# Patient Record
Sex: MALE | Race: WHITE | Employment: UNEMPLOYED | URBAN - METROPOLITAN AREA
[De-identification: names, ages, dates, MRNs, and addresses within clinical notes are randomized per-mention and may not be internally consistent; named-entity substitution may affect disease eponyms.]

---

## 2021-10-29 ENCOUNTER — HOSPITAL ENCOUNTER (EMERGENCY)
Age: 61
End: 2021-10-30
Attending: EMERGENCY MEDICINE

## 2021-10-29 DIAGNOSIS — I46.9 CARDIAC ARREST (HCC): Primary | ICD-10-CM

## 2021-10-29 PROCEDURE — 96374 THER/PROPH/DIAG INJ IV PUSH: CPT

## 2021-10-29 PROCEDURE — 99284 EMERGENCY DEPT VISIT MOD MDM: CPT

## 2021-10-29 PROCEDURE — 6360000002 HC RX W HCPCS

## 2021-10-29 PROCEDURE — 6360000002 HC RX W HCPCS: Performed by: EMERGENCY MEDICINE

## 2021-10-29 RX ADMIN — EPINEPHRINE 1 MCG: 0.1 INJECTION, SOLUTION ENDOTRACHEAL; INTRACARDIAC; INTRAVENOUS at 23:52

## 2021-10-29 RX ADMIN — EPINEPHRINE 1 MG: 0.1 INJECTION, SOLUTION ENDOTRACHEAL; INTRACARDIAC; INTRAVENOUS at 23:47

## 2021-10-30 VITALS — TEMPERATURE: 99.9 F

## 2021-10-30 PROCEDURE — 92950 HEART/LUNG RESUSCITATION CPR: CPT

## 2021-10-30 RX ORDER — EPINEPHRINE 0.1 MG/ML
SYRINGE (ML) INJECTION DAILY PRN
Status: COMPLETED | OUTPATIENT
Start: 2021-10-29 | End: 2021-10-29
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
## 2021-10-30 NOTE — ED NOTES
Bed: 13  Expected date:   Expected time:   Means of arrival:   Comments:     Maria Luisa Ramos RN  10/29/21 5879

## 2021-10-30 NOTE — ED PROVIDER NOTES
h     [address] [facility]   Emergency Department  Faculty Attestation       I performed a history and physical examination of the patient and discussed management with the resident. I reviewed the residents note and agree with the documented findings including all diagnostic interpretations and plan of care. Any areas of disagreement are noted on the chart. I was personally present for the key portions of any procedures. I have documented in the chart those procedures where I was not present during the key portions. I have reviewed the emergency nurses triage note. I agree with the chief complaint, past medical history, past surgical history, allergies, medications, social and family history as documented unless otherwise noted below. Documentation of the HPI, Physical Exam and Medical Decision Making performed by ranjeetibromain is based on my personal performance of the HPI, PE and MDM. For Physician Assistant/ Nurse Practitioner cases/documentation I have personally evaluated this patient and have completed at least one if not all key elements of the E/M (history, physical exam, and MDM). Additional findings are as noted. Pertinent Comments     Primary Care Physician: No primary care provider on file. ED Triage Vitals   BP Temp Temp Source Pulse Resp SpO2 Height Weight   10/29/21 2345 10/29/21 2324 10/29/21 2324 10/29/21 2345 10/29/21 2345 -- -- --   (!) 0/0 99.9 °F (37.7 °C) Rectal (!) 0 (!) 0           History/Physical: This is a 64 y.o. male who presents to the Emergency Department with complaint of cardiac arrest.  Patient was found under the intersection of [address]. Unknown downtime. Upon arrival, patient was in asystole. He received 2 rounds of epinephrine and Narcan. CPR had been going on for approximately 25 minutes. On exam patient is intubated with 7.5 tube with [facility] device that was removed. NC/AT w/ pupils fixed and dilated. Currently getting compressions.  Adequate chest rise with BVM. No abdominal distension. No bvious extremity injury. Intubated, not sedated with no response to stimuli. Cool to the touch. MDM/Plan:   Cardiac arrest   Already received epi x2 + narcan  Exposed with removal of clothing and no signs of dialysis catheter  CPR continued   ETT confirmed with glidescope  Patient receieved 2 rounds of epinephrne and continued CPR and remains in asystole  CPR of > 30 min total  Time of death called at [address]    case       Critical Care: There was significant risk of life threatening deterioration of patient's condition requiring my direct management. Critical care time 30 minutes, excluding any documented procedures.      Shannan Vargas MD  Attending Emergency Physician         Shannan Vargas MD  10/30/21 9049

## 2021-10-30 NOTE — CODE DOCUMENTATION
Tube placement verified per Dr. Brittney Gilbert using Glidescope. Pause and switch CPR providers. Pupils F/D.   Persistent Asystole

## 2021-10-30 NOTE — PROGRESS NOTES
Pt arrives with 7.5 cuffed ETT secured at 26 @ teeth with field bach. CPR in progress. Switched from EMS vent to BMV with inline ETCO2. Bach removed, ETT confirmed via visualization with glidescope per Dr. Ryne Cárdenas, field bach replaced.

## 2021-10-30 NOTE — ED NOTES
Pt's significant other of 40 years states he lives in  to take care of his mom and has a hx IDDM (on multiple types of insulin), a heart problem that was recently diagnosed (unknown what), depression, and schizophrenia since childhood.      Prasanna Barahona RN  10/30/21 5854
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
## 2021-10-30 NOTE — FLOWSHEET NOTE
[address] [facility] [address]   Patient Death Note  DEATH   Shift date: 10/29/2021    Shift day: Friday  Shift #: 3                 Room # 13/13   Name: Katelynn Charles            Age: 64 y.o. Gender: male          Restorationist: No Mormon on file      Place of Mormonism: None  Admit Date & Time: 10/29/2021 11:43 PM     Referral: ED Alert - Full Arrest  Actual date of death: 10/29/2021  TOD: 6572       SITUATION AT DEATH:  Patient arrived to ED13 as a \"Full Arrest,\" and was receiving \"chest compressions via ZACH machine. \" Per report, patient was \"found down on the corner of [facility] for an unknown amount of time. \" Patient arrived as a \"Mark Pulliam. \" Patient continued to receive chest compressions from ED staff. Time of death was declared by Dr. Ronda Arriaga at [phone]. IS THIS A 'S CASE? Yes    SPIRITUAL/EMOTIONAL INTERVENTION:   responded to call from  [facility] indicating that a \"Full Arrest,\" was en route to the ED.  was present throughout \"Code. \" Chemo Yen attempted to locate patient's ID after time of death was called. ED nurse located patient's wallet and  shared information with ED Registration.  attempted call to patient's mother, however, her phone number was incorrect.  called patient's spouse, José Eldridge, who answered call.  facilitated notification of death between José Eldridge and Dr. Edilson Perez. Per Dr. Mike Pereira, family will be coming to the hospital.  informed ED Triage of family's expected arrival.  laid prayer blanket on patient's lap, prior to family visitation.  greeted patient's significant other and friend in ED Family Consultation Room, as they spoke with [employer] .  escorted them to patient's bedside, per nurse approval. Chemo Yen provided support and care to family at bedside. ED medical staff made follow-up visit with family in room to assist in answering any questions.   learned later that José Eldridge is patient's \"girlfriend of over 40 years. \" Kiana Pelletier presented grief packet to Jose Strong and gathered her information for Release of Body form. Per family, patient John Malcolm been sick for a while now. \" Patient's family members were thankful for ED staff support and care to patient. Family Received Grief Packet? Yes     NAME AND PHONE NUMBER OF DOCTOR SIGNING DEATH CERTIFICATE:  Jenniffer Charles are now contacting the  home after a patient death.  spoke to/left message for N/A indicating family's choice for their services.  called  home on N/A. Copy of COMPLETED Release of Body Form Received? Yes    Patient's belongings: Patient's belongings were given to significant other, including clothing, boots, wallet/ID     HOME:  To be determined. NEXT OF KIN:  Name: Bari Black  Relationship: Significant Other  Street Address: [address] [place] [address] Blvd: [facility]  Zip code: [address]   Phone Number: [phone]    Name: Rachael Morel  Relationship: Sister  Phone Number: [phone]    [facility]? No    IF SO, WHAT? Chaplains can continue to provide support to patient's significant other and family, as needed. 10/29/21 9213   Encounter Summary   Services provided to: Family   Referral/Consult From: Multi-disciplinary team  (ED Alert/Full Arrest)   Support System Significant other;Parent; Family members   Continue Visiting   (10/29/2021)   Complexity of Encounter   (10/29/2021)   Length of Encounter 2 hours;30 minutes   Spiritual Assessment Completed Yes   Routine   Type Initial   Crisis   Type ED Alert   Spiritual/Pentecostalism   Type Spiritual support   Grief and Life Adjustment   Type Grief and loss;Death   Assessment Approachable;Tearful;Grieving; Shock   Intervention Active listening;Explored feelings, thoughts, concerns;Explored coping resources;Nurtured hope;Prayer;Provided reading materials/devotional materials;Sustaining presence/ Ministry of presence;Grief care;Discussed illness/injury and it's impact   Outcome Comfort;Expressed gratitude;Grieving;Receptive     Electronically signed by Darian Yee on 10/30/2021 at 3:35 AM.  [facility]  [phone]